# Patient Record
Sex: MALE | Race: WHITE | NOT HISPANIC OR LATINO | Employment: UNEMPLOYED | URBAN - METROPOLITAN AREA
[De-identification: names, ages, dates, MRNs, and addresses within clinical notes are randomized per-mention and may not be internally consistent; named-entity substitution may affect disease eponyms.]

---

## 2023-11-13 ENCOUNTER — APPOINTMENT (OUTPATIENT)
Dept: GENERAL RADIOLOGY | Facility: HOSPITAL | Age: 33
End: 2023-11-13
Payer: MEDICAID

## 2023-11-13 ENCOUNTER — HOSPITAL ENCOUNTER (OUTPATIENT)
Facility: HOSPITAL | Age: 33
Discharge: HOME | End: 2023-11-13
Attending: PEDIATRICS | Admitting: PEDIATRICS
Payer: MEDICAID

## 2023-11-13 VITALS
HEART RATE: 76 BPM | WEIGHT: 160 LBS | RESPIRATION RATE: 20 BRPM | SYSTOLIC BLOOD PRESSURE: 99 MMHG | OXYGEN SATURATION: 98 % | TEMPERATURE: 98.3 F | BODY MASS INDEX: 22.9 KG/M2 | HEIGHT: 70 IN | DIASTOLIC BLOOD PRESSURE: 72 MMHG

## 2023-11-13 DIAGNOSIS — J06.9 UPPER RESPIRATORY TRACT INFECTION, UNSPECIFIED TYPE: Primary | ICD-10-CM

## 2023-11-13 PROCEDURE — 71046 X-RAY EXAM CHEST 2 VIEWS: CPT

## 2023-11-13 PROCEDURE — G0463 HOSPITAL OUTPT CLINIC VISIT: HCPCS | Performed by: PEDIATRICS

## 2023-11-13 RX ORDER — BENZONATATE 200 MG/1
200 CAPSULE ORAL 3 TIMES DAILY PRN
Qty: 30 CAPSULE | Refills: 0 | Status: SHIPPED | OUTPATIENT
Start: 2023-11-13 | End: 2023-11-23

## 2023-11-13 RX ORDER — AZITHROMYCIN 250 MG/1
TABLET, FILM COATED ORAL
Qty: 6 TABLET | Refills: 0 | Status: SHIPPED | OUTPATIENT
Start: 2023-11-13

## 2023-11-13 RX ORDER — PREDNISONE 20 MG/1
60 TABLET ORAL DAILY
Qty: 15 TABLET | Refills: 0 | Status: SHIPPED | OUTPATIENT
Start: 2023-11-13 | End: 2023-11-18

## 2023-11-13 NOTE — DISCHARGE INSTRUCTIONS
Andreas Stock can safely take 1000 mg of acetaminophen (Tylenol) and 800 mg of ibuprofen (Motrin) every six hours as needed.

## 2023-11-13 NOTE — FSED PROVIDER NOTE
Emergency Medicine Evaluation Note  Subjective   History of Present Illness    HPI: Andreas Stock is a 33 y.o. male who presents to the ED with complaints of clear nasal congestion, nonproductive cough, postnasal drip that has been ongoing for roughly 3 weeks.  Patient states initially with thick yellow sinus congestion, sore throat, URI type symptoms, however states remainder of symptoms improved, however with lingering nasal drip and cough.  Patient states achy chest pain when coughing, denies any dyspnea shortness of breath stridor or wheeze.  No history of respiratory illness, patient with history of sleep disorder, states on Requip and other central acting medications, no immunosuppressants or inhaled medications. No other concomitant symptoms. No other known aggravating or alleviating factors.      ROS  Review of Systems   Constitutional: Negative.  Negative for chills and fever.   HENT:  Positive for congestion, postnasal drip and sore throat. Negative for facial swelling, hearing loss, mouth sores, nosebleeds, tinnitus, trouble swallowing and voice change.    Eyes: Negative.    Respiratory: Negative.  Negative for cough, chest tightness and shortness of breath.    Cardiovascular: Negative.  Negative for chest pain and palpitations.   Gastrointestinal:  Negative for abdominal distention, abdominal pain, constipation, diarrhea, nausea and vomiting.   Endocrine: Negative.    Genitourinary: Negative.    Musculoskeletal: Negative.    Skin: Negative.    Allergic/Immunologic: Negative.    Neurological: Negative.    Hematological: Negative.    Psychiatric/Behavioral: Negative.         Previous History:  No past medical history on file.  No past surgical history on file.     No family history on file.  Allergies   Allergen Reactions    Amoxicillin-Pot Clavulanate Rash     Current Outpatient Medications   Medication Instructions    azithromycin (Zithromax Z-Trenton) 250 MG tablet Take 2 tablets by mouth on day 1, then 1  "tablet daily on days 2-5    benzonatate (TESSALON) 200 mg, Oral, 3 Times Daily PRN    predniSONE (DELTASONE) 60 mg, Oral, Daily    Pseudoephedrine HCl  MG tablet sustained-release 24 hour 1 tablet, Oral, Daily       Objective   Physical Exam  Patient Vitals for the past 24 hrs:   BP Temp Pulse Resp SpO2 Height Weight   11/13/23 1451 99/72 98.3 °F (36.8 °C) 76 20 98 % 177.8 cm (70\") 72.6 kg (160 lb)     Physical Exam  Vitals and nursing note reviewed.   Constitutional:       General: He is not in acute distress.     Appearance: He is normal weight. He is not toxic-appearing.   HENT:      Head: Normocephalic and atraumatic.      Right Ear: Tympanic membrane, ear canal and external ear normal. There is no impacted cerumen.      Left Ear: Tympanic membrane, ear canal and external ear normal. There is no impacted cerumen.      Nose: Nose normal. Congestion (clear) present.      Mouth/Throat:      Mouth: Mucous membranes are moist.      Pharynx: Oropharynx is clear. No oropharyngeal exudate.      Comments: Erythematous posterior oropharynx however without tonsillar exudate or focal swelling.  Base of uvula midline.  No intraoral lesions.  Eyes:      General:         Right eye: No discharge.         Left eye: No discharge.      Extraocular Movements: Extraocular movements intact.      Conjunctiva/sclera: Conjunctivae normal.      Pupils: Pupils are equal, round, and reactive to light.   Cardiovascular:      Rate and Rhythm: Normal rate and regular rhythm.      Pulses: Normal pulses.      Heart sounds: Normal heart sounds. No murmur heard.  Pulmonary:      Effort: Pulmonary effort is normal. No respiratory distress.      Breath sounds: Normal breath sounds. No wheezing, rhonchi or rales.   Chest:      Chest wall: No tenderness.   Abdominal:      General: Abdomen is flat. There is no distension.      Palpations: Abdomen is soft.      Tenderness: There is no abdominal tenderness. There is no right CVA tenderness, left " CVA tenderness, guarding or rebound.   Musculoskeletal:         General: No swelling, tenderness, deformity or signs of injury. Normal range of motion.      Cervical back: Normal range of motion and neck supple. No rigidity or tenderness.   Lymphadenopathy:      Cervical: Cervical adenopathy present.   Skin:     General: Skin is warm and dry.      Capillary Refill: Capillary refill takes less than 2 seconds.   Neurological:      General: No focal deficit present.      Mental Status: He is alert and oriented to person, place, and time. Mental status is at baseline.      Cranial Nerves: No cranial nerve deficit.      Sensory: No sensory deficit.      Motor: No weakness.      Gait: Gait normal.   Psychiatric:         Mood and Affect: Mood normal.         Behavior: Behavior normal.         Results  Labs Reviewed - No data to display  XR Chest 2 View   Final Result   Impression:   No acute process identified.         Electronically Signed: Allan Smallwood MD     11/13/2023 2:15 PM CST     Workstation ID: [id number]        The laboratory results, imaging results and other diagnostic exam results were reviewed in the EMR.     Procedures  Procedures    Medical Decision Making    Patient presents to the ED as described above.  Vital signs stable and unremarkable.  Physical exam as described above.  Imaging as described above, no acute processes.  No viral testing obtained as given length of symptoms outside of treatment window, patient is not high risk status.  Patient states allergy to penicillins, unsure of specifics.  Will initiate treatment with steroid burst, Tessalon, azithromycin.  Discussed safe use of prescribed OTC medications. Patient was informed of results and verbalized understanding and agreement with treatment care plan.  Given strict return precautions.  All questions answered.    Diagnosis  Final diagnoses:   Upper respiratory tract infection, unspecified type       Disposition  ED Disposition       ED  Disposition   Discharge    Condition   Stable    Comment   --             PATIENT CONNECTION - [place]  [facility] [address]  [phone]  Call in 3 days  or primary care physician, for contunity of care    [place] [address] [place] [id number] [phone]  Go to   As needed, If symptoms worsen

## 2024-01-30 ENCOUNTER — OFFICE VISIT (OUTPATIENT)
Dept: FAMILY MEDICINE CLINIC | Facility: CLINIC | Age: 34
End: 2024-01-30
Payer: MEDICAID

## 2024-01-30 VITALS
HEIGHT: 70 IN | HEART RATE: 78 BPM | RESPIRATION RATE: 16 BRPM | SYSTOLIC BLOOD PRESSURE: 106 MMHG | DIASTOLIC BLOOD PRESSURE: 64 MMHG | OXYGEN SATURATION: 98 % | BODY MASS INDEX: 23.65 KG/M2 | WEIGHT: 165.2 LBS

## 2024-01-30 DIAGNOSIS — F32.A DEPRESSION, UNSPECIFIED DEPRESSION TYPE: ICD-10-CM

## 2024-01-30 DIAGNOSIS — Z86.19 HISTORY OF SHINGLES: Primary | ICD-10-CM

## 2024-01-30 DIAGNOSIS — G47.61 PERIODIC LIMB MOVEMENT SLEEP DISORDER: ICD-10-CM

## 2024-01-30 DIAGNOSIS — N20.0 NEPHROLITHIASIS: ICD-10-CM

## 2024-01-30 RX ORDER — GABAPENTIN 400 MG/1
400 CAPSULE ORAL 3 TIMES DAILY
Qty: 30 CAPSULE | Refills: 1 | Status: SHIPPED | OUTPATIENT
Start: 2024-01-30

## 2024-01-30 RX ORDER — ROPINIROLE 1 MG/1
1 TABLET, FILM COATED ORAL NIGHTLY
COMMUNITY
End: 2024-01-30

## 2024-01-30 RX ORDER — CHOLECALCIFEROL (VITAMIN D3) 125 MCG
5 CAPSULE ORAL NIGHTLY
COMMUNITY

## 2024-01-30 RX ORDER — QUETIAPINE FUMARATE 200 MG/1
200 TABLET, FILM COATED ORAL NIGHTLY
COMMUNITY
Start: 2023-12-18

## 2024-01-30 RX ORDER — CITALOPRAM 40 MG/1
40 TABLET ORAL DAILY
COMMUNITY
Start: 2024-01-22

## 2024-02-18 PROBLEM — Z86.19 HISTORY OF SHINGLES: Status: ACTIVE | Noted: 2024-02-18

## 2024-02-18 PROBLEM — N20.0 NEPHROLITHIASIS: Status: ACTIVE | Noted: 2024-02-18

## 2024-02-18 PROBLEM — G47.61 PERIODIC LIMB MOVEMENT SLEEP DISORDER: Status: ACTIVE | Noted: 2021-10-25

## 2024-03-15 ENCOUNTER — TELEPHONE (OUTPATIENT)
Dept: FAMILY MEDICINE CLINIC | Facility: CLINIC | Age: 34
End: 2024-03-15
Payer: MEDICAID

## 2024-03-15 RX ORDER — GABAPENTIN 400 MG/1
400 CAPSULE ORAL DAILY
Qty: 90 CAPSULE | Refills: 1 | Status: SHIPPED | OUTPATIENT
Start: 2024-03-15

## 2024-03-15 NOTE — TELEPHONE ENCOUNTER
Caller: Andreas Stock    Relationship: Self    Best call back number: [phone]     Requested Prescriptions:   Requested Prescriptions     Pending Prescriptions Disp Refills    gabapentin (NEURONTIN) 400 MG capsule 30 capsule 1     Sig: Take 1 capsule by mouth 3 (Three) Times a Day.        Pharmacy where request should be sent:      [facility] [id number] [place] [facility] [address] [facility] [phone] [facility] [phone] [facility] [phone]     Last office visit with prescribing clinician: 1/30/2024   Last telemedicine visit with prescribing clinician: Visit date not found   Next office visit with prescribing clinician: 4/30/2024     Additional details provided by patient: PATIENT ONLY TAKES MEDICATION 1 TIME A DAY.    IT IS WRITTEN FOR 1 CAPSULE BY MOUTH 3 TIMES DAILY.    HE NEEDS THIS CHANGED AND HAVE A NEW WRITTEN PRESCRIPTION SENT TO [facility]     Does the patient have less than a 3 day supply:  [x] Yes  [] No    Would you like a call back once the refill request has been completed: [x] Yes [] No    If the office needs to give you a call back, can they leave a voicemail: [x] Yes [] No    Tonya Medrano, PCT   03/15/24 13:03 EDT

## 2024-04-30 ENCOUNTER — LAB (OUTPATIENT)
Dept: FAMILY MEDICINE CLINIC | Facility: CLINIC | Age: 34
End: 2024-04-30
Payer: MEDICAID

## 2024-04-30 ENCOUNTER — OFFICE VISIT (OUTPATIENT)
Dept: FAMILY MEDICINE CLINIC | Facility: CLINIC | Age: 34
End: 2024-04-30
Payer: MEDICAID

## 2024-04-30 VITALS
WEIGHT: 167.4 LBS | BODY MASS INDEX: 23.96 KG/M2 | HEIGHT: 70 IN | OXYGEN SATURATION: 97 % | RESPIRATION RATE: 16 BRPM | SYSTOLIC BLOOD PRESSURE: 108 MMHG | DIASTOLIC BLOOD PRESSURE: 64 MMHG | HEART RATE: 77 BPM

## 2024-04-30 DIAGNOSIS — Z00.00 PREVENTATIVE HEALTH CARE: Primary | ICD-10-CM

## 2024-04-30 DIAGNOSIS — Z13.1 DIABETES MELLITUS SCREENING: ICD-10-CM

## 2024-04-30 DIAGNOSIS — F32.A DEPRESSION, UNSPECIFIED DEPRESSION TYPE: ICD-10-CM

## 2024-04-30 DIAGNOSIS — Z13.220 LIPID SCREENING: ICD-10-CM

## 2024-04-30 DIAGNOSIS — G47.61 PERIODIC LIMB MOVEMENT SLEEP DISORDER: ICD-10-CM

## 2024-04-30 DIAGNOSIS — Z13.0 SCREENING FOR DEFICIENCY ANEMIA: ICD-10-CM

## 2024-04-30 DIAGNOSIS — N20.0 NEPHROLITHIASIS: ICD-10-CM

## 2024-04-30 PROCEDURE — 80048 BASIC METABOLIC PNL TOTAL CA: CPT | Performed by: INTERNAL MEDICINE

## 2024-04-30 PROCEDURE — 2014F MENTAL STATUS ASSESS: CPT | Performed by: INTERNAL MEDICINE

## 2024-04-30 PROCEDURE — 80061 LIPID PANEL: CPT | Performed by: INTERNAL MEDICINE

## 2024-04-30 PROCEDURE — 36415 COLL VENOUS BLD VENIPUNCTURE: CPT

## 2024-04-30 PROCEDURE — 99395 PREV VISIT EST AGE 18-39: CPT | Performed by: INTERNAL MEDICINE

## 2024-04-30 PROCEDURE — 85025 COMPLETE CBC W/AUTO DIFF WBC: CPT | Performed by: INTERNAL MEDICINE

## 2024-04-30 RX ORDER — GABAPENTIN 400 MG/1
800 CAPSULE ORAL DAILY
Qty: 60 CAPSULE | Refills: 0 | Status: SHIPPED | OUTPATIENT
Start: 2024-04-30

## 2024-04-30 NOTE — PROGRESS NOTES
Chief Complaint  Annual Exam    HPI:    Andreas Stock presents to [facility]    Patient is a 34-year-old male presenting for annual preventative care visit.    Patient with a previous history of shingles for which he was treated with valacyclovir on 9/24/2022.  Initially presented as painful, blistering rash along approximately T8 dermatome.  Patient made complete recovery with valacyclovir.     Periodic limb disorder  Previously followed with specialist through [facility] and [facility].  Previously on multiple agents including stimulants, gabapentin, ropinirole, melatonin, and light therapy.  Patient requested restarting gabapentin at last office visit before following up with sleep specialist. She was seen by specialist and agreed that it was likely Seroquel. Recommended following up with psych.     Patient previously got in to see psychiatry at the [facility] and tried to decrease the Seroquel with temazepam. He tried 15 mg and then eventually 30 mg without significant improvement. He tried max dose Ambien, which was also not helpful. He was placed back on Seroquel which was the most effective.  Psych and sleep specialists working together to come up with possible additional plan. Sleep considering formal sleep study. Symptoms stable on gabapentin.     Mood  Currently on Celexa 40 mg daily and Seroquel 200 mg nightly. Mood overall good on current regimen. He notices that seroquel makes periodic limb movement worse at higher doses but has been the most effective. Psych and sleep specialists working together to come up with possible additional plan. Patient considering starting effexor by psych.      History of nephrolithiasis x 2. Strong family history. Most recent episode 2021. No recent episodes or recent recent urinary symptoms. Denies dysuria, urgency, frequency, hematuria, cloudy/foul smelling urine, or flank pain.     Preventative:     Social: Recently switched jobs     Diet and  "Exercise: Overall pretty good     Alcohol, Tobacco, and Recreational Drug use: Rare alcohol     Cancer screenings:  PSA: No known family history of prostate cancer  Colonoscopy: No known family history of colon cancer    Immunizations: May be due for Tdap    [employer] Directive: Not on file        Review of Systems:  ROS negative unless otherwise noted in HPI above.    Past Medical History:   Diagnosis Date    Delayed sleep phase syndrome     Depression     Insomnia disorder     Periodic limb movement disorder          Current Outpatient Medications:     CBD oil (cannabidiol) capsule, Take 1 capsule by mouth Every Night. 50mg, Disp: , Rfl:     CBD oil (cannabidiol) capsule, Take 1 capsule by mouth Daily., Disp: , Rfl:     citalopram (CeleXA) 40 MG tablet, Take 1 tablet by mouth Daily., Disp: , Rfl:     gabapentin (NEURONTIN) 400 MG capsule, Take 1 capsule by mouth Daily., Disp: 90 capsule, Rfl: 1    melatonin 5 MG tablet tablet, Take 1 tablet by mouth Every Night., Disp: , Rfl:     QUEtiapine (SEROquel) 200 MG tablet, Take 1 tablet by mouth Every Night., Disp: , Rfl:     Social History     Socioeconomic History    Marital status: Single   Tobacco Use    Smoking status: Never    Smokeless tobacco: Never   Vaping Use    Vaping status: Never Used   Substance and Sexual Activity    Alcohol use: Yes     Alcohol/week: 1.0 standard drink of alcohol     Types: 1 Drinks containing 0.5 oz of alcohol per week        Objective   Vital Signs:  There were no vitals taken for this visit.  Estimated body mass index is 23.7 kg/m² as calculated from the following:    Height as of 1/30/24: 177.8 cm (70\").    Weight as of 1/30/24: 74.9 kg (165 lb 3.2 oz).    Physical Exam:  General: Well-appearing patient, no apparent distress  HEENT: No posterior pharynx erythema, no tonsillar erythema or exudates,  Cardiac: Regular rate and rhythm, normal S1/S2, no murmur, rubs or gallops, no lower extremity edema  Lungs: Clear to " auscultation bilaterally, no crackles or wheezes  Abdomen: Soft, non-tender, no guarding or rebound tenderness, no hepatosplenomegaly  Skin: No significant rashes or lesions  MSK: Grossly normal tone and strength  Neuro: Alert and oriented x3, CN II-XII grossly intact  Psych: Appropriate mood and affect    Assessment and Plan:    (Z00.00) Preventative health care  Patient is a 34 year old male who is overall doing well. Reviewed social and family history. Encouraged increased healthy diet and exercise and discussed importance to overall health. Up to date with age and gender appropriate cancer screenings. Discussed indicated vaccines based on age and comorbidities. No skin, mood concerns.  Plan:  - Encourage healthy diet and exercise  - Up date vaccines, if necessary  - Screening labs as ordered  - Update cancer screening as below    (G47.61) Periodic limb movement sleep disorder  Assessment: Currently following with specialist including sleep medicine and psychiatry.  Currently attributing worsening symptoms to Seroquel, although symptoms overall best on medication.  Working with specialist to come up with potential additional treatment options  Plan:  -Increase gabapentin to 800 mg daily  -Continue additional medications as prescribed  -Follow-up with specialist as scheduled    (F32.A) Depression, unspecified depression type  Assessment: Mood overall stable on current regimen including Celexa 40 mg daily and Seroquel 200 mg daily.  Follows with psychiatry,   Who is reportedly possibly considering starting Effexor in place of Celexa.  Plan:  -Continue Celexa and Seroquel for now  -Follow-up with psychiatry as scheduled    (N20.0) Nephrolithiasis  Assessment: History of nephrolithiasis without recent recurrence of symptoms.  Plan:  - Monitor  - Stay well-hydrated    (Z13.220) Lipid screening - Plan: Lipid panel    (Z13.0) Screening for deficiency anemia - Plan: CBC & Differential    (Z13.1) Diabetes mellitus  screening - Plan: Basic metabolic panel       Patient was given instructions and counseling regarding his condition or for health maintenance advice. Please see specific information pulled into the AVS if appropriate.       Dr Bravo Israel   Internal Medicine Physician  [facility]  [address], [address]  [place] [address]

## 2024-04-30 NOTE — PATIENT INSTRUCTIONS
Please stop at lab on second floor to have blood drawn    Check on Tdap, HPV vaccine    Medications:  Increase gabapentin to 800 mg daily  Continue current medications as prescribed    Encourage healthy diet and exercise    Follow up with specialists as scheduled    Follow up annually or sooner if something

## 2024-05-01 LAB
ANION GAP SERPL CALCULATED.3IONS-SCNC: 10.7 MMOL/L (ref 5–15)
BASOPHILS # BLD AUTO: 0.02 10*3/MM3 (ref 0–0.2)
BASOPHILS NFR BLD AUTO: 0.5 % (ref 0–1.5)
BUN SERPL-MCNC: 15 MG/DL (ref 6–20)
BUN/CREAT SERPL: 15.2 (ref 7–25)
CALCIUM SPEC-SCNC: 9.7 MG/DL (ref 8.6–10.5)
CHLORIDE SERPL-SCNC: 107 MMOL/L (ref 98–107)
CHOLEST SERPL-MCNC: 216 MG/DL (ref 0–200)
CO2 SERPL-SCNC: 22.3 MMOL/L (ref 22–29)
CREAT SERPL-MCNC: 0.99 MG/DL (ref 0.76–1.27)
DEPRECATED RDW RBC AUTO: 41.6 FL (ref 37–54)
EGFRCR SERPLBLD CKD-EPI 2021: 103.8 ML/MIN/1.73
EOSINOPHIL # BLD AUTO: 0.04 10*3/MM3 (ref 0–0.4)
EOSINOPHIL NFR BLD AUTO: 0.9 % (ref 0.3–6.2)
ERYTHROCYTE [DISTWIDTH] IN BLOOD BY AUTOMATED COUNT: 12 % (ref 12.3–15.4)
GLUCOSE SERPL-MCNC: 88 MG/DL (ref 65–99)
HCT VFR BLD AUTO: 44.7 % (ref 37.5–51)
HDLC SERPL-MCNC: 42 MG/DL (ref 40–60)
HGB BLD-MCNC: 14.9 G/DL (ref 13–17.7)
IMM GRANULOCYTES # BLD AUTO: 0.01 10*3/MM3 (ref 0–0.05)
IMM GRANULOCYTES NFR BLD AUTO: 0.2 % (ref 0–0.5)
LDLC SERPL CALC-MCNC: 156 MG/DL (ref 0–100)
LDLC/HDLC SERPL: 3.68 {RATIO}
LYMPHOCYTES # BLD AUTO: 1.41 10*3/MM3 (ref 0.7–3.1)
LYMPHOCYTES NFR BLD AUTO: 33.2 % (ref 19.6–45.3)
MCH RBC QN AUTO: 30.8 PG (ref 26.6–33)
MCHC RBC AUTO-ENTMCNC: 33.3 G/DL (ref 31.5–35.7)
MCV RBC AUTO: 92.4 FL (ref 79–97)
MONOCYTES # BLD AUTO: 0.27 10*3/MM3 (ref 0.1–0.9)
MONOCYTES NFR BLD AUTO: 6.4 % (ref 5–12)
NEUTROPHILS NFR BLD AUTO: 2.5 10*3/MM3 (ref 1.7–7)
NEUTROPHILS NFR BLD AUTO: 58.8 % (ref 42.7–76)
NRBC BLD AUTO-RTO: 0 /100 WBC (ref 0–0.2)
PLATELET # BLD AUTO: 215 10*3/MM3 (ref 140–450)
PMV BLD AUTO: 10.5 FL (ref 6–12)
POTASSIUM SERPL-SCNC: 4.1 MMOL/L (ref 3.5–5.2)
RBC # BLD AUTO: 4.84 10*6/MM3 (ref 4.14–5.8)
SODIUM SERPL-SCNC: 140 MMOL/L (ref 136–145)
TRIGL SERPL-MCNC: 98 MG/DL (ref 0–150)
VLDLC SERPL-MCNC: 18 MG/DL (ref 5–40)
WBC NRBC COR # BLD AUTO: 4.25 10*3/MM3 (ref 3.4–10.8)

## 2024-07-15 RX ORDER — GABAPENTIN 400 MG/1
800 CAPSULE ORAL DAILY
Qty: 60 CAPSULE | Refills: 0 | Status: SHIPPED | OUTPATIENT
Start: 2024-07-15

## 2024-07-26 RX ORDER — CITALOPRAM 40 MG/1
TABLET ORAL
Qty: 90 TABLET | Refills: 1 | Status: SHIPPED | OUTPATIENT
Start: 2024-07-26

## 2024-08-15 RX ORDER — GABAPENTIN 400 MG/1
800 CAPSULE ORAL DAILY
Qty: 60 CAPSULE | Refills: 0 | Status: SHIPPED | OUTPATIENT
Start: 2024-08-15

## 2024-10-29 RX ORDER — GABAPENTIN 400 MG/1
800 CAPSULE ORAL DAILY
Qty: 60 CAPSULE | Refills: 0 | Status: SHIPPED | OUTPATIENT
Start: 2024-10-29

## 2024-11-05 ENCOUNTER — PATIENT MESSAGE (OUTPATIENT)
Dept: FAMILY MEDICINE CLINIC | Facility: CLINIC | Age: 34
End: 2024-11-05
Payer: COMMERCIAL

## 2024-11-05 NOTE — TELEPHONE ENCOUNTER
Gave message to patient at 3pm and scheduled an appt with Dr Israel tomorrow at 9:30am (30 minutes).

## 2024-11-06 ENCOUNTER — OFFICE VISIT (OUTPATIENT)
Dept: FAMILY MEDICINE CLINIC | Facility: CLINIC | Age: 34
End: 2024-11-06
Payer: COMMERCIAL

## 2024-11-06 VITALS
HEIGHT: 70 IN | RESPIRATION RATE: 18 BRPM | OXYGEN SATURATION: 97 % | HEART RATE: 87 BPM | WEIGHT: 165.4 LBS | BODY MASS INDEX: 23.68 KG/M2 | DIASTOLIC BLOOD PRESSURE: 64 MMHG | SYSTOLIC BLOOD PRESSURE: 112 MMHG

## 2024-11-06 DIAGNOSIS — J30.2 SEASONAL ALLERGIES: ICD-10-CM

## 2024-11-06 DIAGNOSIS — Z23 NEEDS FLU SHOT: ICD-10-CM

## 2024-11-06 DIAGNOSIS — Z23 NEED FOR COVID-19 VACCINE: ICD-10-CM

## 2024-11-06 DIAGNOSIS — Z20.2 POSSIBLE EXPOSURE TO STI: Primary | ICD-10-CM

## 2024-11-06 PROCEDURE — 99214 OFFICE O/P EST MOD 30 MIN: CPT | Performed by: INTERNAL MEDICINE

## 2024-11-06 RX ORDER — TAMSULOSIN HYDROCHLORIDE 0.4 MG/1
CAPSULE ORAL
COMMUNITY
Start: 2024-09-12 | End: 2024-11-06

## 2024-11-06 RX ORDER — TRAMADOL HYDROCHLORIDE 50 MG/1
50 TABLET ORAL EVERY 6 HOURS PRN
COMMUNITY
Start: 2024-09-12 | End: 2024-11-06

## 2024-11-06 NOTE — PROGRESS NOTES
Chief Complaint  STI Expposure and Sore Throat    HPI:    Andreas Stock presents to [facility]    Patient is a 34-year-old male presenting for STI concern.    Patient had sexual contact with female partner approximately 5 days ago, who informed patient after the fact that she had a history of genital herpes.  She reportedly was not having an outbreak, and patient wore a condom. Patient did not see lesions on partner.  There was oral contact, although no specific lesions noted. He has not recently had any recent symptoms. Denies dysuria, urgency, frequency, hematuria, cloudy/foul smelling urine, or flank pain.  Denies any genital pain or lesions.    He noticed that over the last couple weeks he has noticed increase and seasonal allergies.  Endorses congestion and postnasal drip.  Denies cough, sore throat, sinus pain/pressure, ear pain pressure, lymphadenopathy, myalgias, headache, and fatigue. Denies fever, chills, nausea, vomiting. Denies recent sick contact or travel.  Symptoms are occasionally improved with over-the-counter antihistamine.  Noticed symptoms previously been worse when cutting the grass.    Review of Systems:  ROS negative unless otherwise noted in HPI above.    Past Medical History:   Diagnosis Date    Delayed sleep phase syndrome     Depression     Insomnia disorder     Periodic limb movement disorder          Current Outpatient Medications:     citalopram (CeleXA) 40 MG tablet, Take 1 tablet by mouth daily. Please schedule an appointment for refill., Disp: 90 tablet, Rfl: 1    gabapentin (NEURONTIN) 400 MG capsule, TAKE 2 CAPSULES BY MOUTH EVERY DAY, Disp: 60 capsule, Rfl: 0    melatonin 5 MG tablet tablet, Take 1 tablet by mouth Every Night., Disp: , Rfl:     QUEtiapine (SEROquel) 200 MG tablet, Take 1 tablet by mouth Every Night., Disp: , Rfl:     CBD oil (cannabidiol) capsule, Take 1 capsule by mouth Daily. (Patient not taking: Reported on 11/6/2024), Disp:  ", Rfl:     tamsulosin (FLOMAX) 0.4 MG capsule 24 hr capsule, TAKE 1 CAPSULE BY MOUTH DAILY 30 MINUTES AFTER DINNER. (Patient not taking: Reported on 11/6/2024), Disp: , Rfl:     traMADol (ULTRAM) 50 MG tablet, Take 1 tablet by mouth Every 6 (Six) Hours As Needed. for pain (Patient not taking: Reported on 11/6/2024), Disp: , Rfl:     Social History     Socioeconomic History    Marital status: Single   Tobacco Use    Smoking status: Never    Smokeless tobacco: Never   Vaping Use    Vaping status: Never Used   Substance and Sexual Activity    Alcohol use: Yes     Alcohol/week: 1.0 standard drink of alcohol     Types: 1 Drinks containing 0.5 oz of alcohol per week        Objective   Vital Signs:  /64   Pulse 87   Resp 18   Ht 177.8 cm (70\")   Wt 75 kg (165 lb 6.4 oz)   SpO2 97%   BMI 23.73 kg/m²   Estimated body mass index is 23.73 kg/m² as calculated from the following:    Height as of this encounter: 177.8 cm (70\").    Weight as of this encounter: 75 kg (165 lb 6.4 oz).    Physical Exam:  General: Well-appearing patient, no apparent distress  HEENT: No posterior pharynx erythema, no tonsillar erythema or exudates, normal external auditory canals, TM normal without bulging or erythema, clear postnasal drip, no oral lesions appreciated  Cardiac: Regular rate and rhythm, normal S1/S2, no murmur, rubs or gallops, no lower extremity edema  Lungs: Clear to auscultation bilaterally, no crackles or wheezes  Skin: No significant rashes or lesions  MSK: Grossly normal tone and strength  Neuro: Alert and oriented x3, CN II-XII grossly intact  Psych: Appropriate mood and affect    Assessment and Plan:    (Z20.2) Possible exposure to STI -   Assessment: Patient with recent predominantly oral HSV 2 contact with patient that was not demonstrating active lesions.  Patient currently asymptomatic.  Patient prefers to get tested for HSV and will have to wait in order for patient to possibly develop antibodies.  Patient aware " to notify clinic if symptoms of outbreak in order to likely start valacyclovir.  Plan:   - HSV 1 & 2 - Specific Antibody, IgG  - Monitor for oral and skin lesions  - Encouraged safe sex practices  - Hold on additional STI testing per patient request    (J30.2) Seasonal allergies  Assessment: Patient with postnasal drip and congestion most likely secondary to seasonal allergies.  Discussed  Plan:  - Nonsedating antihistamine like Claritin, Zyrtec, or Allegra daily  - Consider adding nasal steroid like Flonase, Nasacort, or Nasonex  - Avoid potential allergens    (Z23) Need for COVID-19 vaccine  (Z23) Needs flu shot  Assessment: Patient due for flu and COVID-vaccine and prefers to get at the same time.  Patient to follow-up with local pharmacy as clinic does not carry COVID.  Plan:  - Flu and COVID vaccines at local pharmacy        Patient was given instructions and counseling regarding his condition or for health maintenance advice. Please see specific information pulled into the AVS if appropriate.       Dr Bravo Israel   Internal Medicine Physician  [facility]  [address], [address]  [place] [address]

## 2024-11-06 NOTE — PATIENT INSTRUCTIONS
Lab only appointment in about a month--HSV testing    Hold on additional STI testing for now    Non-sedation antihistamine claritin, zyrtec, allegra    Patient to get flu and COVID at local pharmacy

## 2024-11-13 RX ORDER — QUETIAPINE FUMARATE 200 MG/1
TABLET, FILM COATED ORAL
Qty: 90 TABLET | Refills: 0 | Status: SHIPPED | OUTPATIENT
Start: 2024-11-13

## 2024-11-27 RX ORDER — GABAPENTIN 400 MG/1
800 CAPSULE ORAL DAILY
Qty: 60 CAPSULE | Refills: 1 | Status: SHIPPED | OUTPATIENT
Start: 2024-11-27

## 2024-11-27 NOTE — TELEPHONE ENCOUNTER
Caller: Andreas Stock    Relationship: Self    Best call back number: [phone]     Requested Prescriptions:   Requested Prescriptions     Pending Prescriptions Disp Refills    gabapentin (NEURONTIN) 400 MG capsule 60 capsule 0     Sig: Take 2 capsules by mouth Daily.        Pharmacy where request should be sent: [facility] - [place], [place] - [address] [facility] - [phone]  - [phone] FX     Last office visit with prescribing clinician: 11/6/2024   Last telemedicine visit with prescribing clinician: Visit date not found   Next office visit with prescribing clinician: 5/2/2025     Additional details provided by patient: HAS 4 DAYS LEFT    Does the patient have less than a 3 day supply:  [] Yes  [x] No    Would you like a call back once the refill request has been completed: [] Yes [] No    If the office needs to give you a call back, can they leave a voicemail: [] Yes [] No    Thais Martinez Rep   11/27/24 13:23 EST

## 2024-12-18 RX ORDER — CITALOPRAM HYDROBROMIDE 40 MG/1
TABLET ORAL
Qty: 90 TABLET | Refills: 0 | Status: SHIPPED | OUTPATIENT
Start: 2024-12-18

## 2024-12-30 RX ORDER — QUETIAPINE FUMARATE 200 MG/1
TABLET, FILM COATED ORAL
Qty: 90 TABLET | Refills: 0 | Status: SHIPPED | OUTPATIENT
Start: 2024-12-30

## 2025-01-06 RX ORDER — GABAPENTIN 400 MG/1
800 CAPSULE ORAL DAILY
Qty: 60 CAPSULE | Refills: 0 | Status: SHIPPED | OUTPATIENT
Start: 2025-01-06

## 2025-02-04 RX ORDER — GABAPENTIN 400 MG/1
800 CAPSULE ORAL DAILY
Qty: 60 CAPSULE | Refills: 0 | Status: SHIPPED | OUTPATIENT
Start: 2025-02-04

## 2025-03-03 RX ORDER — GABAPENTIN 400 MG/1
800 CAPSULE ORAL DAILY
Qty: 60 CAPSULE | Refills: 3 | Status: SHIPPED | OUTPATIENT
Start: 2025-03-03

## 2025-03-17 RX ORDER — CITALOPRAM HYDROBROMIDE 40 MG/1
TABLET ORAL
Qty: 90 TABLET | Refills: 0 | Status: SHIPPED | OUTPATIENT
Start: 2025-03-17

## 2025-04-07 RX ORDER — QUETIAPINE FUMARATE 200 MG/1
TABLET, FILM COATED ORAL
Qty: 90 TABLET | Refills: 0 | Status: SHIPPED | OUTPATIENT
Start: 2025-04-07

## 2025-05-20 ENCOUNTER — TELEPHONE (OUTPATIENT)
Dept: FAMILY MEDICINE CLINIC | Facility: CLINIC | Age: 35
End: 2025-05-20
Payer: COMMERCIAL

## 2025-05-20 NOTE — TELEPHONE ENCOUNTER
Caller: Andreas Stock    Relationship: Self    Best call back number: [phone]     What medication are you requesting: VALCYLOVIR    What are your current symptoms: COLD SORES    How long have you been experiencing symptoms:      Have you had these symptoms before:    [x] Yes  [] No    Have you been treated for these symptoms before:   [x] Yes  [] No    If a prescription is needed, what is your preferred pharmacy and phone number:    [id number] - [place], [place] - [address] [place] [address] [phone] [facility] [phone]

## 2025-05-21 NOTE — TELEPHONE ENCOUNTER
Patient has not been seen by a provider for this specific problem, nor do I see a history of valacyclovir previously being prescribed.  I would recommend patient schedule an appointment to be evaluated at his convenience, especially since his last multiple appointments have either been cancellations or no-shows.    Bravo Israel MD

## 2025-06-02 RX ORDER — QUETIAPINE FUMARATE 200 MG/1
TABLET, FILM COATED ORAL
Qty: 90 TABLET | Refills: 0 | Status: SHIPPED | OUTPATIENT
Start: 2025-06-02

## 2025-06-02 RX ORDER — CITALOPRAM HYDROBROMIDE 40 MG/1
TABLET ORAL
Qty: 90 TABLET | Refills: 0 | Status: SHIPPED | OUTPATIENT
Start: 2025-06-02

## 2025-07-08 RX ORDER — GABAPENTIN 400 MG/1
800 CAPSULE ORAL DAILY
Qty: 60 CAPSULE | Refills: 2 | Status: SHIPPED | OUTPATIENT
Start: 2025-07-08